# Patient Record
Sex: FEMALE | Employment: FULL TIME | URBAN - METROPOLITAN AREA
[De-identification: names, ages, dates, MRNs, and addresses within clinical notes are randomized per-mention and may not be internally consistent; named-entity substitution may affect disease eponyms.]

---

## 2020-01-06 ENCOUNTER — HOSPITAL ENCOUNTER (OUTPATIENT)
Dept: PHYSICAL THERAPY | Age: 50
Discharge: HOME | End: 2020-01-06

## 2020-01-06 PROCEDURE — 9990000029 HC GAP PACKAGE

## 2020-01-06 PROCEDURE — 9990000010 HC NO CHARGE VISIT

## 2020-01-06 NOTE — FLOWSHEET NOTE
The [address] [place] [address] and Sports Rehabilitation, [address], [address], [place]  Phone: [phone]   IDS:     [phone]       Lower Extremity Daily Performance Training Note  Date:  2020    Patient Name:  Judith Client    :  1970  MRN: [id number]  Restrictions/Precautions:   Medical/Treatment Diagnosis Information:   ·  L HS strain  ·    Insurance/Certification information:   [facility]    Physician Information:   N/A      Pain level: 2-3/10 with running 0/10 at rest    Visit Number: 1 ()      Subjective: Pt reports doing some cross country skiing and falling about two weeks ago. Feeling much better and able to ADL's without difficulty, however has pain when trying to jog/run. Objective:  Observation:     (20): L Hamstring- + ecchymosis, -deformity, -inflammation      Strength / L R Comment   Quad  5  5     Hamstring  4  5  with pain   Gastroc         Hip  flexion  5  5     Hip abd  4+  4+     Hip Ext  4  5                   Exercises:  Exercise/Equipment Resistance/Repetitions Other comments   HS Stretch 5x30\" To tolerance    Incline calf stretch 5x30\"         PRE's: Hip flexion 4# 3x10 1/6   Hip EXT 4# 3x10 1/6   Hip Abduction  3# 3x10 1/6   HS curls (standing) 3# 3x10 1/6   Bridges 3x10 1/6   Clams  Blue 3x10 1/6   Lateral band walks Blue 3x10  1/6                                                                                                                                                                                                                                                                          Bike (warm-up) 5'      Other Therapeutic Activities: Ice 15'    Home Exercise Program: Given     Patient Education:     (): Discussed holding off on jogging/running, focusing on stretches/PRE's with HEP. Discussed Ice and activity modification as needed.  If pain is persistent and isn't getting better over the next couple weeks recommended follow-up with physician. Assessment:  [x] Patient tolerated treatment well [] Patient limited by fatigue  [] Patient limited by pain  [] Patient limited by other medical complications  [] Other:     Prognosis: [x] Good [] Fair  [] Poor    Patient Requires Follow-up: [x] Yes  [] No    Plan:   [x] Continue per plan of care [] Alter current plan (see comments)  [] Plan of care initiated [] Hold pending MD visit [] Discharge  Plan for Next Session:  Progress as tolerated. MD Follow-up: PRN    Electronically signed by:  Prashant Souza ATC     Tx was performed by [facility] as a part of the [facility] program following PT's recommendations/guidance.        Cosigned by:

## 2020-01-10 ENCOUNTER — HOSPITAL ENCOUNTER (OUTPATIENT)
Dept: PHYSICAL THERAPY | Age: 50
Discharge: HOME | End: 2020-01-10

## 2020-01-10 NOTE — FLOWSHEET NOTE
The [address] [place] [address] and Sports Rehabilitation, [address], [address], [place]  Phone: [phone]   CDZ:     [phone]       Lower Extremity Daily Performance Training Note  Date:  1/10/2020    Patient Name:  Shaylee Vieira    :  1970  MRN: [id number]  Restrictions/Precautions:   Medical/Treatment Diagnosis Information:   ·  L HS strain     Insurance/Certification information:   [facility]    Physician Information:   N/A      Pain level: 2-3/10 with running 0/10 at rest    Visit Number: 2 (2 of 7) 1/10    Subjective: Pt reports doing some cross country skiing and falling about two weeks ago. Feeling much better and able to ADL's without difficulty, however has pain when trying to jog/run. Objective:  Observation:     (20): L Hamstring- + ecchymosis, -deformity, -inflammation      Strength 1/6 L R Comment   Quad  5  5     Hamstring  4  5  with pain   Gastroc         Hip  flexion  5  5     Hip abd  4+  4+     Hip Ext  4  5                   Exercises:  Exercise/Equipment Resistance/Repetitions Other comments   HS Stretch 5x30\" To tolerance    Incline calf stretch 5x30\" 6        PRE's: Hip flexion 4# 3x10 1/6   Hip EXT 4# 3x10 1/6   Hip Abduction  3# 3x10 1/6   HS curls (standing) 3# 3x10 1/6   Bridges 3x10 1/6   Clams  Blue 3x10 1/6   Lateral band walks Blue 3x10  1/6                       [employer]:     Leg Press 120# 3x10 DL 1/10   Leg EXT 60# 3x10 DL 1/10   Leg Curl 30 3x10 DL 1/10                                                                                                                                                                                                                                  Bike (warm-up) 5'      Other Therapeutic Activities: Ice 15'    Home Exercise Program: Given     Patient Education:     (): Discussed holding off on jogging/running, focusing on stretches/PRE's with HEP.  Discussed Ice and activity modification as needed. If pain is persistent and isn't getting better over the next couple weeks recommended follow-up with physician. Assessment:  [x] Patient tolerated treatment well [] Patient limited by fatigue  [] Patient limited by pain  [] Patient limited by other medical complications  [] Other:     Prognosis: [x] Good [] Fair  [] Poor    Patient Requires Follow-up: [x] Yes  [] No    Plan:   [x] Continue per plan of care [] Alter current plan (see comments)  [] Plan of care initiated [] Hold pending MD visit [] Discharge  Plan for Next Session:  Progress as tolerated. MD Follow-up: PRN    Electronically signed by:  Francesco Dunn ATC     Tx was performed by [facility] as a part of the [facility] [place] program following PT's recommendations/guidance.        Cosigned by:

## 2020-01-20 ENCOUNTER — HOSPITAL ENCOUNTER (OUTPATIENT)
Dept: PHYSICAL THERAPY | Age: 50
Discharge: HOME | End: 2020-01-20

## 2020-01-22 ENCOUNTER — HOSPITAL ENCOUNTER (OUTPATIENT)
Dept: PHYSICAL THERAPY | Age: 50
Discharge: HOME | End: 2020-01-22

## 2020-01-22 NOTE — FLOWSHEET NOTE
The [address] [place] [address] and Sports Rehabilitation, [address], [address], [place]  Phone: [phone]   FELICE:     [phone]       Lower Extremity Daily Performance Training Note  Date:  2020    Patient Name:  Sophia Cole    :  1970  MRN: [id number]  Restrictions/Precautions:   Medical/Treatment Diagnosis Information:   ·  L HS strain     Insurance/Certification information:   [facility]    Physician Information:   N/A      Pain level: 2-3/10 with running 0/10 at rest    Visit Number: 4 (4 of 7)     Subjective: Pt reports no pain at today's visit. Objective:  Observation:     (20): L Hamstring- + ecchymosis, -deformity, -inflammation      Strength / L R Comment   Quad  5  5     Hamstring  4  5  with pain   Gastroc         Hip  flexion  5  5     Hip abd  4+  4+     Hip Ext  4  5                   Exercises:  Exercise/Equipment Resistance/Repetitions Other comments   HS Stretch 5x30\" To tolerance    Incline calf stretch 5x30\"         PRE's: Hip flexion 4# 3x10 1/6   Hip EXT 4# 3x10 1/6   Hip Abduction  3# 3x10 1/6   HS curls (standing) 3# 3x10 1/6   Bridges 3x10 1/6   Clams  Blue 3x10 1/6   Lateral band walks Blue 3x10  1/6                       [employer]:     Leg Press 120# 3x10 DL 1/10   Leg EXT 60# 3x10 DL 1/10   Leg Curl 30 3x10 DL 1/10                                                                                                                                                                                                                                AlterG 10' 70-80%      Other Therapeutic Activities: Ice 15'    Home Exercise Program: Given     Patient Education:     (): Discussed holding off on jogging/running, focusing on stretches/PRE's with HEP. Discussed Ice and activity modification as needed. If pain is persistent and isn't getting better over the next couple weeks recommended follow-up with physician.

## 2020-01-29 ENCOUNTER — HOSPITAL ENCOUNTER (OUTPATIENT)
Dept: PHYSICAL THERAPY | Age: 50
Discharge: HOME | End: 2020-01-29

## 2020-01-29 NOTE — FLOWSHEET NOTE
physician.     (1/22): Discussed still holding off on running and trying brisk walk for up to a mile, stop with any pain or soreness. Assessment:  [x] Patient tolerated treatment well [] Patient limited by fatigue  [] Patient limited by pain  [] Patient limited by other medical complications  [] Other:     Prognosis: [x] Good [] Fair  [] Poor    Patient Requires Follow-up: [x] Yes  [] No    Plan:   [x] Continue per plan of care [] Alter current plan (see comments)  [] Plan of care initiated [] Hold pending MD visit [] Discharge  Plan for Next Session:  Progress as tolerated. MD Follow-up: PRN    Electronically signed by:  Kayode Ratliff ATC     Tx was performed by [facility] as a part of the [facility] program following PT's recommendations/guidance.        Cosigned by:

## 2021-06-01 ENCOUNTER — HOSPITAL ENCOUNTER (OUTPATIENT)
Dept: WOMENS IMAGING | Age: 51
Discharge: HOME | End: 2021-06-01
Payer: COMMERCIAL

## 2021-06-01 VITALS — HEIGHT: 65 IN | BODY MASS INDEX: 30.82 KG/M2 | WEIGHT: 185 LBS

## 2021-06-01 DIAGNOSIS — Z12.31 VISIT FOR SCREENING MAMMOGRAM: ICD-10-CM

## 2021-06-01 PROCEDURE — 77067 SCR MAMMO BI INCL CAD: CPT
